# Patient Record
(demographics unavailable — no encounter records)

---

## 2024-10-15 NOTE — HISTORY OF PRESENT ILLNESS
[FreeTextEntry1] : Rheumatology Consultation Note   Chief Complaint: Fibromyalgia, lower back and hip pain    History of Present Illness: Kennedi Britton is a 74 year old woman with a history of Atrial fibrillation, GERD, Fatigue, weakness, Fibromyalgia, bilateral knee replacements, hypothyroidism, here as a former patient of Dr. Ochoa for a follow up.   Last saw Dr. Ochoa in June 2021. Sleep Study back in 2021, was negative, doesn't snore. Still only taking lyrica since 2014,150mg in the am, 150mg in the pm. Never tried gabapentin, cymbalta, venlafaxine, amitryptiline, savella. Has never tried tender point injections.   Most bothersome feature now is mostly pain in the lower part of the back and groin pain. Pain is worse with activity or standing for long periods of time. Pain just stays at lower back. Stiffness all day, no difference with activity. Takes tylenol 650mg as needed, helps when she takes it.  Hip pain, in the groin. Worse with activity. Hears clicking. Pain also in the buttock area. Take tylenol 650mg as needed, takes the edge off. Tried lidocaine patches and creams, does help. She feels with all this her walking is getting worse and she is feeling unstable.   Otherwise for fibromyalgia, feels it's not flaring right now but still with a lot of fatigue and feeling wiped out most days. Gets roughly 7-8 hours of sleep, but always tired. No exercise due to pain, tried aqua therapy many years ago helped. No stretching, felipe chi, yoga. Never tried accupunture, never tried massage therapy. Went to chiropractor, it helpd a little. No underlying mood disorders. Last year got COVID, stent placed in heart, next week, getting a loop recorder next week. Got RSV recently. Rhinovirus infection as well. August got COVID again. No traumatic childhood.   Last mammogram 1 month ago showed dense tissue, having a follow up in 6 months, some cysts in the past. Last colonoscopy was 2012 and was normal. No hx of IBD. No hx of AS, PSA, and Reactive Arthritis.    SLE ROS negative for oral ulcers, facial rash, other rash, chest pain, abdominal pain, hair loss, Raynaud's, joint swelling, unexplained numbness or weakness, seizures, frothy urine or hematuria. Inflammatory arthritis ROS negative for enthesitis, dactylitis, psoriasis/ rashes, eye inflammation, inflammatory low back pain, IBD.  Family Hx negative for autoimmune diseases.

## 2024-10-15 NOTE — PHYSICAL EXAM
[General Appearance - Alert] : alert [Sclera] : the sclera and conjunctiva were normal [PERRL With Normal Accommodation] : pupils were equal in size, round, and reactive to light [Neck Appearance] : the appearance of the neck was normal [Apical Impulse] : the apical impulse was normal [Heart Sounds] : normal S1 and S2 [Abdomen Soft] : soft [Musculoskeletal - Swelling] : no joint swelling seen [] : no rash [FreeTextEntry1] : Normal hip bilateral internal and external rotation, Normal hip flexion and extension however pain in groin area with hip flexion on right  side

## 2024-10-15 NOTE — DATA REVIEWED
[FreeTextEntry1] : Available notes, and pertinent labs & imaging in EMR reviewed. Paper records reviewed, scanned to EMR. Pertinent labs and imaging summarized in HPI or A/P.  On chart review negative ELFEGO, Willams, RNP, dsDNA, ANCA, MPO, PR3, RF, CCP, C3, C4, Immunoglobulins panel, Lupus AC, Cardiolipin IgG and IgM, Beta 2 Glycoprotein IgG and IgM, CPK, Aldolase, UA, UPCR, SPEP,

## 2024-10-15 NOTE — ASSESSMENT
[FreeTextEntry1] : Assessment: #Fibromyalgia -diagnosed in 2014 by Dr. Ochoa  -currently has only ever been on lyrica   #Lower back and Hip pain  -features of mechanical pain  #A-fib on eliquis   #Bilateral knee replacement more than 5 years ago   #Hypothryoidism -on synthroid   Discussion: Kennedi Britton is a 74 year old woman with a history of Atrial fibrillation, GERD, Fatigue, weakness, Fibromyalgia, bilateral knee replacements, hypothyroidism, here as a former patient of Dr. Ochoa for a follow up.  Based on available history, exam, and diagnostics patient's fibromyalgia is stable. She is not interested at this time in switching or adding additional medications, would like to trial aqua therapy again and focus on increasing non-weight bearing exercises to see if that helps.   In terms of lower back pain and hip pain, pain sounds more mechanical in nature as worse with activity. Will start with x-rays of affected joints, in meantime can increase tylenol to standing as that seems to help with pain.    Plan: -Reviewed nonpharmacologic FMS therapies as well. Encouraged to incorporate small activities that patient finds beneficial during day, optimize stretching and light exercise, and be mindful of mood, be aware not to overextend on days patient has pain, ensure adequate rest between strenuous activities. Patient verbalized understanding. -follow up x-rays of lumbar spine, SI joint, and hip  -referral to PT given specifically for aqua therapy for fibromyalgia and lower back pain  -for now can continue lyrica 150mg BID, currently being prescribed by PCP Dr. Holly Price (in future can consider trialing cymbalta, gabapentin, venlafaxine, TCA's, milnacipran) -possibly consider trial of massage or accupunture therapy in future  -start trial of tylenol 650mg daily, if improves increase to 650mg BID (can't do NSAIDS given pt is on eliquis for a-fib)  All questions and concerns addressed to my best possible ability, patient verbalizes understanding and is agreeable.   RTC in 2 months

## 2024-11-07 NOTE — HISTORY OF PRESENT ILLNESS
[FreeTextEntry8] : Ms. 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 Last seen by Dr. Price 7/10/24.  She came with ( Shaun) and wishes share information.  Recently checked EKG 2 weeks ago by her cardiology Dr. Ramirez @ [place]. No recent medication change. She tells me that she feels very depressed due to election result and somewhat anxious. She pushes herself to eat food then lay down in bed due to lack of energy. She has had passive negative idea for self, but never had action plan. She is a good sleeper, sleeps 8 hours /night. Sleep study was negative for ANTONIA. She gained some body weight. Current BMI 33.39. She engages in Aqua therapy. Denies Uro, GI issues. Denied fever, chills,CP, SOB, abdominal pain, n/v/c/d.

## 2024-11-07 NOTE — HISTORY OF PRESENT ILLNESS
[FreeTextEntry8] : Ms. 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 Last seen by Dr. Price 7/10/24.  She came with ( Shaun) and wishes share information.  Recently checked EKG 2 weeks ago by her cardiology Dr. Ramirez @ [place]. No recent medication change. She tells me that she feels very depressed due to election result and somewhat anxious. She pushes herself to eat food then lay down in bed due to lack of energy. She has had passive negative idea for self, but never had action plan. She is a good sleeper, sleeps 8 hours /night. Sleep study was negative for ANTONIA. She gained some body weight. Current BMI 33.39. She engages in Aqua therapy. Denies Uro, GI issues. Denied fever, chills,CP, SOB, abdominal pain, n/v/c/d.

## 2024-11-07 NOTE — REVIEW OF SYSTEMS
[FreeTextEntry2] : Constitutional:  no fever and no chills.  Eyes:  no discharge.  HEENT:  no earache.  Cardiovascular:  no chest pain, no palpitations and no lower extremity edema.  Respiratory:  no shortness of breath, no wheezing and no cough.  Gastrointestinal:  no abdominal pain, no nausea and no vomiting.  Genitourinary:  no dysuria.  Musculoskeletal:  no joint pain.  Integumentary:  no itching.  Neurological:  no headache.  Psychiatric:  not suicidal.  Hematologic/Lymphatic:  no easy bleeding. [de-identified] : see hpi

## 2024-11-07 NOTE — ASSESSMENT
[FreeTextEntry1] : Ms. 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  # reactive depression Denies generalized anxiety. Her Fibromyalgia is managed by Lyrica 150mg po bid and recently seen by rheumatology Dr. Craft, 10/15/24. Offered our  team's contact information as she dose not wish it at this time but has interest as a resources. She does not wish medical treatment for mood management.  Check full lab as she missed lab that Dr. Price ordered in the past.   RTO pending lab results.

## 2024-11-07 NOTE — PHYSICAL EXAM
[de-identified] : WDWN in NAD HEENT:  unremarkable Neck:  supple, no JVD, no LN Lungs:  CTA B/L, no W/R/R Heart:  Reg rate, +S1S2, no M/R/G Abdomen:  soft, NT, ND, +BS, no masses, no HS-megaly Genital: No pubic or genital lesions noted. Ext:  no C/C/E Neuro:  no focal deficits

## 2024-11-07 NOTE — PHYSICAL EXAM
[de-identified] : WDWN in NAD HEENT:  unremarkable Neck:  supple, no JVD, no LN Lungs:  CTA B/L, no W/R/R Heart:  Reg rate, +S1S2, no M/R/G Abdomen:  soft, NT, ND, +BS, no masses, no HS-megaly Genital: No pubic or genital lesions noted. Ext:  no C/C/E Neuro:  no focal deficits

## 2024-11-07 NOTE — REVIEW OF SYSTEMS
[FreeTextEntry2] : Constitutional:  no fever and no chills.  Eyes:  no discharge.  HEENT:  no earache.  Cardiovascular:  no chest pain, no palpitations and no lower extremity edema.  Respiratory:  no shortness of breath, no wheezing and no cough.  Gastrointestinal:  no abdominal pain, no nausea and no vomiting.  Genitourinary:  no dysuria.  Musculoskeletal:  no joint pain.  Integumentary:  no itching.  Neurological:  no headache.  Psychiatric:  not suicidal.  Hematologic/Lymphatic:  no easy bleeding. [de-identified] : see hpi

## 2025-02-04 NOTE — DISCUSSION/SUMMARY
[Patient] : a call from patient [FreeTextEntry1] : Admitted to [facility] in [facility] for rapid afib (started on amio drip) along with asthma exacerbation, given solumedrol, potassium supplemented for hypokalemia,

## 2025-04-23 NOTE — HISTORY OF PRESENT ILLNESS
[de-identified] : This is a 74 year old woman with a history of Atrial fibrillation, GERD, Fatigue, weakness, Fibromyalgia.  Patient presents for the evaluation of an anemia.  \par  Patient has a history of a GI bleed in [place].  In [place] was ill, went to the ER. Transfused her for a Hg 5.6, took 3 units of PRBC to raise the Hg to 9.3g.d/l\par  \par  Patient had an EGD and a colonoscopy that did not show an active bleed.  Upon return to the  [facility]. Went to [place], and patient had a Normal Hg in the 12's range, however they did find a blood clot, pulmonary embolism.  Patient was on Elqiuis 2.5mg BID.  Due to the pulmonary embolism, patient had increased this to 5mg BID.\par  \par  Patient had a history of a bleeding rectal polyp in 2019.  Was on 5mg BID. After the large bleeding episode in , the dose of Eliquis was decreased to the 2.5mg BID dose until more recently.  In [place] did not Have any black tarry or red blood in stools.  No vaginal bleeding.  \par  \par  Occasional nose bleeds periodically.  \par  \par  Patient will need a new GHI in the [facility] group.  \par  \par  Had multiples surgeries no blood clotting or bleeding episodes following multiple surgeries including 2 knee replacements  \par  OB history.  \par  A1 \par  1st child lost at 2 years old.  \par  2st pregnancy miscarriage at 5 months\par  3rd miscarriage\par  4th pregancy successful.    \par   [de-identified] : Juan had an eventful end of the year. Momo ended up having a hospital aadmission in [place] over the winter due to regular corona Virus.  Patient had an exacerbation of Afib. Had a watchman device placed in an effort to come off of the Eliqusi  Made full recovery from the corona virus.   Had the wa tchhman device placed in [facility].    Breast US in march 2025 Birads 3 continues follow up Q 6 months.

## 2025-04-23 NOTE — ASSESSMENT
[FreeTextEntry1] : This is a 75 year old woman with a history of fibromyalgia, Afib, on rate control and A/C on Eliquis. Patient had a history of a GI bleed in 2019. Had an EGD, Coloscopy and capsule study. Was dose decreased to Eliquis 2.5mg BID given the GI bleeding. Patient then went on a trip to see family in [place]. Presented to an ER there with severe fatigue and weakness. Found to have a hg 5.4g/dl. Was transfused 3 units of PRBC Hg 9.1g/dl remained 9.1g/dl-9.3g/dl before behind discharged with a Hg of 9.1g/dl.  Since then pateint has not had any further exacerbations of the hemolytic anemia despite continuing on the Eliquis at a dose of 2.5mg BID for the Afib and PE/DVT prophylaxis.  Hx of GI bleeding would not recommend the larger doses such as the full 5mg dose for afib prophylaxis.   Patient had the watchman placed December 6th 2024.  Patient has now been taken off the Eliquis 2.5mg BID for 6 weeks following that procedure, had the ARNOLDO for re-evaluation, then the Eliquis was discontinued and then placed on plavix and ASA 81mg. In 6 months  patient scheduled to come off the plavix.    October 2022, patient had a CT angiogram that one did  NOT show a recurrent Pulmonary embolism.   Patient also had bilateral Adilia DVT's at the time of the original PE.

## 2025-04-23 NOTE — ASSESSMENT
[FreeTextEntry1] : This is a 75 year old woman with a history of fibromyalgia, Afib, on rate control and A/C on Eliquis. Patient had a history of a GI bleed in 2019. Had an EGD, Coloscopy and capsule study. Was dose decreased to Eliquis 2.5mg BID given the GI bleeding. Patient then went on a trip to see family in [place]. Presented to an ER there with severe fatigue and weakness. Found to have a hg 5.4g/dl. Was transfused 3 units of PRBC Hg 9.1g/dl remained 9.1g/dl-9.3g/dl before behind discharged with a Hg of 9.1g/dl.  Since then pateint has not had any further exacerbations of the hemolytic anemia despite continuing on the Eliquis at a dose of 2.5mg BID for the Afib and PE/DVT prophylaxis.  Hx of GI bleeding would not recommend the larger doses such as the full 5mg dose for afib prophylaxis.   Patient had the watchman placed December 6th 2024.  Patient has now been taken off the Eliquis 2.5mg BID for 6 weeks following that procedure, had the ARNOLDO for re-evaluation, then the Eliquis was discontinued and then placed on plavix and ASA 81mg. In 6 months  patient scheduled to come off the plavix.    October 2022, patient had a CT angiogram that one did  NOT show a recurrent Pulmonary embolism.   Patient also had bilateral Adilia DVT's at the time of the original PE.

## 2025-04-23 NOTE — HISTORY OF PRESENT ILLNESS
[de-identified] : This is a 74 year old woman with a history of Atrial fibrillation, GERD, Fatigue, weakness, Fibromyalgia.  Patient presents for the evaluation of an anemia.  \par  Patient has a history of a GI bleed in [place].  In [place] was ill, went to the ER. Transfused her for a Hg 5.6, took 3 units of PRBC to raise the Hg to 9.3g.d/l\par  \par  Patient had an EGD and a colonoscopy that did not show an active bleed.  Upon return to the  [facility]. Went to [place], and patient had a Normal Hg in the 12's range, however they did find a blood clot, pulmonary embolism.  Patient was on Elqiuis 2.5mg BID.  Due to the pulmonary embolism, patient had increased this to 5mg BID.\par  \par  Patient had a history of a bleeding rectal polyp in 2019.  Was on 5mg BID. After the large bleeding episode in , the dose of Eliquis was decreased to the 2.5mg BID dose until more recently.  In [place] did not Have any black tarry or red blood in stools.  No vaginal bleeding.  \par  \par  Occasional nose bleeds periodically.  \par  \par  Patient will need a new GHI in the [facility] group.  \par  \par  Had multiples surgeries no blood clotting or bleeding episodes following multiple surgeries including 2 knee replacements  \par  OB history.  \par  A1 \par  1st child lost at 2 years old.  \par  2st pregnancy miscarriage at 5 months\par  3rd miscarriage\par  4th pregancy successful.    \par   [de-identified] : Juan had an eventful end of the year. Momo ended up having a hospital aadmission in [place] over the winter due to regular corona Virus.  Patient had an exacerbation of Afib. Had a watchman device placed in an effort to come off of the Eliqusi  Made full recovery from the corona virus.   Had the wa tchhman device placed in [facility].    Breast US in march 2025 Birads 3 continues follow up Q 6 months.

## 2025-07-20 NOTE — PHYSICAL EXAM
[No Acute Distress] : no acute distress [Well Nourished] : well nourished [Well Developed] : well developed [Well-Appearing] : well-appearing [PERRL] : pupils equal round and reactive to light [EOMI] : extraocular movements intact [Normal Oropharynx] : the oropharynx was normal [Normal TMs] : both tympanic membranes were normal [No Lymphadenopathy] : no lymphadenopathy [Supple] : supple [Thyroid Normal, No Nodules] : the thyroid was normal and there were no nodules present [No Respiratory Distress] : no respiratory distress  [No Accessory Muscle Use] : no accessory muscle use [Clear to Auscultation] : lungs were clear to auscultation bilaterally [Normal Rate] : normal rate  [Regular Rhythm] : with a regular rhythm [Normal S1, S2] : normal S1 and S2 [No Carotid Bruits] : no carotid bruits [Pedal Pulses Present] : the pedal pulses are present [Normal Appearance] : normal in appearance [No Nipple Discharge] : no nipple discharge [No Axillary Lymphadenopathy] : no axillary lymphadenopathy [Soft] : abdomen soft [Non Tender] : non-tender [Non-distended] : non-distended [No Masses] : no abdominal mass palpated [No HSM] : no HSM [Normal Bowel Sounds] : normal bowel sounds [Normal Supraclavicular Nodes] : no supraclavicular lymphadenopathy [Normal Axillary Nodes] : no axillary lymphadenopathy [Normal Posterior Cervical Nodes] : no posterior cervical lymphadenopathy [Normal Anterior Cervical Nodes] : no anterior cervical lymphadenopathy [Normal Inguinal Nodes] : no inguinal lymphadenopathy [No Joint Swelling] : no joint swelling [No Rash] : no rash [Normal Gait] : normal gait [Normal Affect] : the affect was normal [de-identified] : 2-3/6 murmur (reports chronic)

## 2025-07-20 NOTE — PHYSICAL EXAM
[No Acute Distress] : no acute distress [Well Nourished] : well nourished [Well Developed] : well developed [Well-Appearing] : well-appearing [PERRL] : pupils equal round and reactive to light [EOMI] : extraocular movements intact [Normal Oropharynx] : the oropharynx was normal [Normal TMs] : both tympanic membranes were normal [No Lymphadenopathy] : no lymphadenopathy [Supple] : supple [Thyroid Normal, No Nodules] : the thyroid was normal and there were no nodules present [No Respiratory Distress] : no respiratory distress  [No Accessory Muscle Use] : no accessory muscle use [Clear to Auscultation] : lungs were clear to auscultation bilaterally [Normal Rate] : normal rate  [Regular Rhythm] : with a regular rhythm [Normal S1, S2] : normal S1 and S2 [No Carotid Bruits] : no carotid bruits [Pedal Pulses Present] : the pedal pulses are present [Normal Appearance] : normal in appearance [No Nipple Discharge] : no nipple discharge [No Axillary Lymphadenopathy] : no axillary lymphadenopathy [Soft] : abdomen soft [Non Tender] : non-tender [Non-distended] : non-distended [No Masses] : no abdominal mass palpated [No HSM] : no HSM [Normal Bowel Sounds] : normal bowel sounds [Normal Supraclavicular Nodes] : no supraclavicular lymphadenopathy [Normal Axillary Nodes] : no axillary lymphadenopathy [Normal Posterior Cervical Nodes] : no posterior cervical lymphadenopathy [Normal Anterior Cervical Nodes] : no anterior cervical lymphadenopathy [Normal Inguinal Nodes] : no inguinal lymphadenopathy [No Joint Swelling] : no joint swelling [No Rash] : no rash [Normal Gait] : normal gait [Normal Affect] : the affect was normal [de-identified] : 2-3/6 murmur (reports chronic)

## 2025-07-20 NOTE — PAST MEDICAL HISTORY
[Postmenopausal] : postmenopausal [Total Preg ___] : G[unfilled] [Full Term ___] : Full Term: [unfilled] [AB Spont ___] : miscarriages: [unfilled]  [de-identified] : had hysterectomy [FreeTextEntry1] : no vaginal bleeding

## 2025-07-20 NOTE — PAST MEDICAL HISTORY
[Postmenopausal] : postmenopausal [Total Preg ___] : G[unfilled] [Full Term ___] : Full Term: [unfilled] [AB Spont ___] : miscarriages: [unfilled]  [de-identified] : had hysterectomy [FreeTextEntry1] : no vaginal bleeding

## 2025-07-20 NOTE — HISTORY OF PRESENT ILLNESS
[FreeTextEntry1] : physical [de-identified] : 76 yo female with h/o as below here for CPE. In March hospitalized for a week, at [facility] in [place] for URI with coronavirus (not covid), asthma exacerbation, afib, atherosclerotic heart disease, but mostly for asthma exacerbation, no procedures done. Has watchman in as was having bad nosebleeds so had watchman put in December, taken off eliquis, hematology aware. Had breast imaging done and advised 6 month b/l breast US, due now.    Has had b/l lower back pain in sacroiliac region since hospitalization, making it difficult to walk and gaining weight because of it.   Would like PT. Needs name of pulm to see.   Has chronic fatigue, ongoing.   Asking about medication for weight loss. Saw neuro Dr. Holder as baseline as sister has frontal lobe deterioration and said everything was ok, had test done for protein for alzheimer's and came out negative. Has been on ASA and plavix but may come off plavix as more than 6 months since stent placed to LAD.  Cardiologist at [facility] Dr. Zay SEGAL, Dr. Duke Beal regular cardiologist. Had allergic reaction to ?definity contrast while under anesthesia for ARNOLDO. No other active issues.

## 2025-07-20 NOTE — HISTORY OF PRESENT ILLNESS
[FreeTextEntry1] : physical [de-identified] : 76 yo female with h/o as below here for CPE. In March hospitalized for a week, at [facility] in [place] for URI with coronavirus (not covid), asthma exacerbation, afib, atherosclerotic heart disease, but mostly for asthma exacerbation, no procedures done. Has watchman in as was having bad nosebleeds so had watchman put in December, taken off eliquis, hematology aware. Had breast imaging done and advised 6 month b/l breast US, due now.    Has had b/l lower back pain in sacroiliac region since hospitalization, making it difficult to walk and gaining weight because of it.   Would like PT. Needs name of pulm to see.   Has chronic fatigue, ongoing.   Asking about medication for weight loss. Saw neuro Dr. Holder as baseline as sister has frontal lobe deterioration and said everything was ok, had test done for protein for alzheimer's and came out negative. Has been on ASA and plavix but may come off plavix as more than 6 months since stent placed to LAD.  Cardiologist at [facility] Dr. Zay SEGAL, Dr. Duke Beal regular cardiologist. Had allergic reaction to ?definity contrast while under anesthesia for ARNOLDO. No other active issues.

## 2025-07-20 NOTE — HEALTH RISK ASSESSMENT
[Never] : Never [Patient reported mammogram was normal] : Patient reported mammogram was normal [Patient reported bone density results were normal] : Patient reported bone density results were normal [Patient reported colonoscopy was normal] : Patient reported colonoscopy was normal [With Significant Other] : lives with significant other [Retired] : retired [] :  [Fully functional (bathing, dressing, toileting, transferring, walking, feeding)] : Fully functional (bathing, dressing, toileting, transferring, walking, feeding) [Fully functional (using the telephone, shopping, preparing meals, housekeeping, doing laundry, using] : Fully functional and needs no help or supervision to perform IADLs (using the telephone, shopping, preparing meals, housekeeping, doing laundry, using transportation, managing medications and managing finances) [Smoke Detector] : smoke detector [Carbon Monoxide Detector] : carbon monoxide detector [Designated Healthcare Proxy] : Designated healthcare proxy [Relationship: ___] : Relationship: [unfilled] [2 - 4 times a month (2 pts)] : 2-4 times a month (2 points) [1 or 2 (0 pts)] : 1 or 2 (0 points) [Never (0 pts)] : Never (0 points) [No] : In the past 12 months have you used drugs other than those required for medical reasons? No [No falls in past year] : Patient reported no falls in the past year [0] : 2) Feeling down, depressed, or hopeless: Not at all (0) [PHQ-2 Negative - No further assessment needed] : PHQ-2 Negative - No further assessment needed [None] : Patient does not have any barriers to medication adherence [Yes] : Reviewed medication list for presence of high-risk medications. [Blood Thinners] : blood thinners [Other____] : [unfilled] [MammogramDate] : 09/24 [PapSmearComments] : no longer getting them [BoneDensityDate] : 08/23 [BoneDensityComments] : osteopenia [ColonoscopyDate] : 12/19 [ColonoscopyComments] : utd recently [de-identified] :  [AdvancecareDate] : 07/25

## 2025-07-20 NOTE — PAST MEDICAL HISTORY
[Postmenopausal] : postmenopausal [Total Preg ___] : G[unfilled] [Full Term ___] : Full Term: [unfilled] [AB Spont ___] : miscarriages: [unfilled]  [de-identified] : had hysterectomy [FreeTextEntry1] : no vaginal bleeding

## 2025-07-20 NOTE — ASSESSMENT
[Vaccines Reviewed] : Immunizations reviewed today. Please see immunization details in the vaccine log within the immunization flowsheet.  [FreeTextEntry1] : 76 yo female with h/o as above including paroxysmal afib now s/p watchman, DVT/PE s/p AC and former GI bleed, hyperlipidemia, CAD s/p stent to LAD, hypothyroidism, preDM, osteopenia, pancreatic cyst, fibromyalgia/chronic fatigue, asthma, MAFLD, obesity here for CPE. 1.  CV - bp acceptable, following with cardiology and EP at [facility], check lipids on statin 2.  Pulm - referred to new pulm within [facility] for asthma/SOB 3.  GI - colonoscopy utd, rx MRI abdomen to f/up pancreatic cyst when due in a few months, s/p hep A and hep B for MAFLD, would check fib4 score when labs come back 4.  Gyn - no further paps for age, rx breast US due now (overdue) and will be due for mammo in September 5.  Endo - rx dexa given as due; check tsh on synthroid, a1c and albumin for h/o preDM; will try zepbound if covered for obesity 6.  [facility] - rx PT given for lower back pain 7.  HCM - check other below labs, had cbc/iron studies few months ago; consider covid booster and flu when due, pcv20 due today, other vaccines utd 8.  RTO prn or 1 year (as regularly following with cardiology)

## 2025-07-20 NOTE — PHYSICAL EXAM
[No Acute Distress] : no acute distress [Well Nourished] : well nourished [Well Developed] : well developed [Well-Appearing] : well-appearing [PERRL] : pupils equal round and reactive to light [EOMI] : extraocular movements intact [Normal Oropharynx] : the oropharynx was normal [Normal TMs] : both tympanic membranes were normal [No Lymphadenopathy] : no lymphadenopathy [Supple] : supple [Thyroid Normal, No Nodules] : the thyroid was normal and there were no nodules present [No Respiratory Distress] : no respiratory distress  [No Accessory Muscle Use] : no accessory muscle use [Clear to Auscultation] : lungs were clear to auscultation bilaterally [Normal Rate] : normal rate  [Regular Rhythm] : with a regular rhythm [Normal S1, S2] : normal S1 and S2 [No Carotid Bruits] : no carotid bruits [Pedal Pulses Present] : the pedal pulses are present [Normal Appearance] : normal in appearance [No Nipple Discharge] : no nipple discharge [No Axillary Lymphadenopathy] : no axillary lymphadenopathy [Soft] : abdomen soft [Non Tender] : non-tender [Non-distended] : non-distended [No Masses] : no abdominal mass palpated [No HSM] : no HSM [Normal Bowel Sounds] : normal bowel sounds [Normal Supraclavicular Nodes] : no supraclavicular lymphadenopathy [Normal Axillary Nodes] : no axillary lymphadenopathy [Normal Posterior Cervical Nodes] : no posterior cervical lymphadenopathy [Normal Anterior Cervical Nodes] : no anterior cervical lymphadenopathy [Normal Inguinal Nodes] : no inguinal lymphadenopathy [No Joint Swelling] : no joint swelling [No Rash] : no rash [Normal Gait] : normal gait [Normal Affect] : the affect was normal [de-identified] : 2-3/6 murmur (reports chronic)

## 2025-07-20 NOTE — HEALTH RISK ASSESSMENT
[Never] : Never [Patient reported mammogram was normal] : Patient reported mammogram was normal [Patient reported bone density results were normal] : Patient reported bone density results were normal [Patient reported colonoscopy was normal] : Patient reported colonoscopy was normal [With Significant Other] : lives with significant other [Retired] : retired [] :  [Fully functional (bathing, dressing, toileting, transferring, walking, feeding)] : Fully functional (bathing, dressing, toileting, transferring, walking, feeding) [Fully functional (using the telephone, shopping, preparing meals, housekeeping, doing laundry, using] : Fully functional and needs no help or supervision to perform IADLs (using the telephone, shopping, preparing meals, housekeeping, doing laundry, using transportation, managing medications and managing finances) [Smoke Detector] : smoke detector [Carbon Monoxide Detector] : carbon monoxide detector [Designated Healthcare Proxy] : Designated healthcare proxy [Relationship: ___] : Relationship: [unfilled] [2 - 4 times a month (2 pts)] : 2-4 times a month (2 points) [1 or 2 (0 pts)] : 1 or 2 (0 points) [Never (0 pts)] : Never (0 points) [No] : In the past 12 months have you used drugs other than those required for medical reasons? No [No falls in past year] : Patient reported no falls in the past year [0] : 2) Feeling down, depressed, or hopeless: Not at all (0) [PHQ-2 Negative - No further assessment needed] : PHQ-2 Negative - No further assessment needed [None] : Patient does not have any barriers to medication adherence [Yes] : Reviewed medication list for presence of high-risk medications. [Blood Thinners] : blood thinners [Other____] : [unfilled] [MammogramDate] : 09/24 [PapSmearComments] : no longer getting them [BoneDensityDate] : 08/23 [BoneDensityComments] : osteopenia [ColonoscopyDate] : 12/19 [ColonoscopyComments] : utd recently [de-identified] :  [AdvancecareDate] : 07/25

## 2025-07-20 NOTE — HISTORY OF PRESENT ILLNESS
[FreeTextEntry1] : physical [de-identified] : 78 yo female with h/o as below here for CPE. In March hospitalized for a week, at [facility] in [place] for URI with coronavirus (not covid), asthma exacerbation, afib, atherosclerotic heart disease, but mostly for asthma exacerbation, no procedures done. Has watchman in as was having bad nosebleeds so had watchman put in December, taken off eliquis, hematology aware. Had breast imaging done and advised 6 month b/l breast US, due now.    Has had b/l lower back pain in sacroiliac region since hospitalization, making it difficult to walk and gaining weight because of it.   Would like PT. Needs name of pulm to see.   Has chronic fatigue, ongoing.   Asking about medication for weight loss. Saw neuro Dr. Holder as baseline as sister has frontal lobe deterioration and said everything was ok, had test done for protein for alzheimer's and came out negative. Has been on ASA and plavix but may come off plavix as more than 6 months since stent placed to LAD.  Cardiologist at [facility] Dr. Zay SEGAL, Dr. Duke Beal regular cardiologist. Had allergic reaction to ?definity contrast while under anesthesia for ARNOLDO. No other active issues.

## 2025-07-20 NOTE — ASSESSMENT
[Vaccines Reviewed] : Immunizations reviewed today. Please see immunization details in the vaccine log within the immunization flowsheet.  [FreeTextEntry1] : 76 yo female with h/o as above including paroxysmal afib now s/p watchman, DVT/PE s/p AC and former GI bleed, hyperlipidemia, CAD s/p stent to LAD, hypothyroidism, preDM, osteopenia, pancreatic cyst, fibromyalgia/chronic fatigue, asthma, MAFLD, obesity here for CPE. 1.  CV - bp acceptable, following with cardiology and EP at [facility], check lipids on statin 2.  Pulm - referred to new pulm within [facility] for asthma/SOB 3.  GI - colonoscopy utd, rx MRI abdomen to f/up pancreatic cyst when due in a few months, s/p hep A and hep B for MAFLD, would check fib4 score when labs come back 4.  Gyn - no further paps for age, rx breast US due now (overdue) and will be due for mammo in September 5.  Endo - rx dexa given as due; check tsh on synthroid, a1c and albumin for h/o preDM; will try zepbound if covered for obesity 6.  [facility] - rx PT given for lower back pain 7.  HCM - check other below labs, had cbc/iron studies few months ago; consider covid booster and flu when due, pcv20 due today, other vaccines utd 8.  RTO prn or 1 year (as regularly following with cardiology)

## 2025-07-20 NOTE — ASSESSMENT
[Vaccines Reviewed] : Immunizations reviewed today. Please see immunization details in the vaccine log within the immunization flowsheet.  [FreeTextEntry1] : 78 yo female with h/o as above including paroxysmal afib now s/p watchman, DVT/PE s/p AC and former GI bleed, hyperlipidemia, CAD s/p stent to LAD, hypothyroidism, preDM, osteopenia, pancreatic cyst, fibromyalgia/chronic fatigue, asthma, MAFLD, obesity here for CPE. 1.  CV - bp acceptable, following with cardiology and EP at [facility], check lipids on statin 2.  Pulm - referred to new pulm within [facility] for asthma/SOB 3.  GI - colonoscopy utd, rx MRI abdomen to f/up pancreatic cyst when due in a few months, s/p hep A and hep B for MAFLD, would check fib4 score when labs come back 4.  Gyn - no further paps for age, rx breast US due now (overdue) and will be due for mammo in September 5.  Endo - rx dexa given as due; check tsh on synthroid, a1c and albumin for h/o preDM; will try zepbound if covered for obesity 6.  [facility] - rx PT given for lower back pain 7.  HCM - check other below labs, had cbc/iron studies few months ago; consider covid booster and flu when due, pcv20 due today, other vaccines utd 8.  RTO prn or 1 year (as regularly following with cardiology)

## 2025-07-20 NOTE — HEALTH RISK ASSESSMENT
[Never] : Never [Patient reported mammogram was normal] : Patient reported mammogram was normal [Patient reported bone density results were normal] : Patient reported bone density results were normal [Patient reported colonoscopy was normal] : Patient reported colonoscopy was normal [With Significant Other] : lives with significant other [Retired] : retired [] :  [Fully functional (bathing, dressing, toileting, transferring, walking, feeding)] : Fully functional (bathing, dressing, toileting, transferring, walking, feeding) [Fully functional (using the telephone, shopping, preparing meals, housekeeping, doing laundry, using] : Fully functional and needs no help or supervision to perform IADLs (using the telephone, shopping, preparing meals, housekeeping, doing laundry, using transportation, managing medications and managing finances) [Smoke Detector] : smoke detector [Carbon Monoxide Detector] : carbon monoxide detector [Designated Healthcare Proxy] : Designated healthcare proxy [Relationship: ___] : Relationship: [unfilled] [2 - 4 times a month (2 pts)] : 2-4 times a month (2 points) [1 or 2 (0 pts)] : 1 or 2 (0 points) [Never (0 pts)] : Never (0 points) [No] : In the past 12 months have you used drugs other than those required for medical reasons? No [No falls in past year] : Patient reported no falls in the past year [0] : 2) Feeling down, depressed, or hopeless: Not at all (0) [PHQ-2 Negative - No further assessment needed] : PHQ-2 Negative - No further assessment needed [None] : Patient does not have any barriers to medication adherence [Yes] : Reviewed medication list for presence of high-risk medications. [Blood Thinners] : blood thinners [Other____] : [unfilled] [MammogramDate] : 09/24 [PapSmearComments] : no longer getting them [BoneDensityDate] : 08/23 [BoneDensityComments] : osteopenia [ColonoscopyDate] : 12/19 [ColonoscopyComments] : utd recently [de-identified] :  [AdvancecareDate] : 07/25

## 2025-07-20 NOTE — REVIEW OF SYSTEMS
[Fatigue] : fatigue [Recent Change In Weight] : ~T recent weight change [Negative] : Heme/Lymph [FreeTextEntry5] : see hpi [FreeTextEntry6] : see hpi